# Patient Record
Sex: FEMALE
[De-identification: names, ages, dates, MRNs, and addresses within clinical notes are randomized per-mention and may not be internally consistent; named-entity substitution may affect disease eponyms.]

---

## 2017-08-08 ENCOUNTER — HOSPITAL ENCOUNTER (EMERGENCY)
Dept: HOSPITAL 14 - H.ER | Age: 27
Discharge: HOME | End: 2017-08-08
Payer: MEDICAID

## 2017-08-08 VITALS
OXYGEN SATURATION: 100 % | DIASTOLIC BLOOD PRESSURE: 69 MMHG | SYSTOLIC BLOOD PRESSURE: 135 MMHG | HEART RATE: 61 BPM | TEMPERATURE: 97.8 F | RESPIRATION RATE: 16 BRPM

## 2017-08-08 VITALS — BODY MASS INDEX: 25.6 KG/M2

## 2017-08-08 DIAGNOSIS — N76.0: Primary | ICD-10-CM

## 2017-08-08 NOTE — ED PDOC
HPI: Female  Pain


Time Seen by Provider: 08/08/17 05:27


Chief Complaint (Nursing): Abnormal Skin Integrity


Chief Complaint (Provider): vaginal burning


History Per: Patient


History/Exam Limitations: no limitations


Onset/Duration Of Symptoms: Days (3)


Current Symptoms Are (Timing): Still Present


Quality Of Discomfort: Burning


Additional History Per: Patient


Additional Complaint(s): 





27 y/o female presents for eval of burning to vaginal area x 3 days.  Patient 

states she was seen less than an month ago for similar symptoms, had STD 

testing which was negative, but is concerned that this could be whats causing 

her burning.  Patient denies new sexual partner/encounter since last ED visit.  

States symptoms started after being at the beach over the weekend.  Denies fever

, nausea/vomiting, abdominal pain/pelvic pain, dysuria, hematuria, vaginal 

bleeding/discharge, foul odor. 





Past Medical History


Reviewed: Historical Data, Nursing Documentation, Vital Signs


Vital Signs: 





 Last Vital Signs











Temp  97.8 F   08/08/17 05:37


 


Pulse  61   08/08/17 05:37


 


Resp  16   08/08/17 05:37


 


BP  135/69   08/08/17 05:37


 


Pulse Ox  100   08/08/17 05:37














- Medical History


PMH: No Chronic Diseases


   Denies: Chronic Kidney Disease





- Surgical History


Surgical History: No Surg Hx





- Family History


Family History: States: Unknown Family Hx





- Home Medications


Home Medications: 


 Ambulatory Orders











 Medication  Instructions  Recorded


 


Phenazopyridine [Pyridium] 100 mg PO BID #6 tab 07/10/17














- Allergies


Allergies/Adverse Reactions: 


 Allergies











Allergy/AdvReac Type Severity Reaction Status Date / Time


 


No Known Allergies Allergy   Verified 07/10/17 12:55














Review of Systems


ROS Statement: Except As Marked, All Systems Reviewed And Found Negative


Genitourinary Female: Positive for: Other (vaginal burning)





Physical Exam





- Reviewed


Nursing Documentation Reviewed: Yes


Vital Signs Reviewed: Yes





- Physical Exam


Appears: Positive for: Well, Non-toxic, No Acute Distress


Head Exam: Positive for: ATRAUMATIC, NORMAL INSPECTION, NORMOCEPHALIC


Skin: Positive for: Normal Color


Eye Exam: Positive for: Normal appearance


Cardiovascular/Chest: Positive for: Regular Rate, Rhythm


Respiratory: Positive for: Normal Breath Sounds


Gastrointestinal/Abdominal: Positive for: Normal Exam, Bowel Sounds, Soft.  

Negative for: Tenderness


Pelvic Exam: Positive for: External Exam Normal, No Cerv. Motion Tender, 

Discharge (minimal white discharged noted labia minora with mild erythema; no 

swelling, abscess, lesions noted), Other (exam chaperoned by Alexia ED tech).  

Negative for: Active Bleeding, Blood, Cervicitis


Extremity: Positive for: Normal ROM


Neurologic/Psych: Positive for: Alert, Oriented





- Laboratory Results


Urine Pregnancy POC: Negative


Urine dip results: Negative for: Leukocyte Esterase, Blood, Nitrate, Ketones, 

Glucose





- ECG


O2 Sat by Pulse Oximetry: 100





- Progress


ED Course And Treament: 





urine, genital cultures





Patient given Diflucan dose in ED, educated on proper hygiene.


Advised follow up Gyn 2-3 days.


Return to ED for worsening/concerning symptoms.








Disposition





- Clinical Impression


Clinical Impression: 


 Vaginitis








- Patient ED Disposition


Is Patient to be Admitted: No


Counseled Patient/Family Regarding: Studies Performed, Diagnosis, Need For 

Followup





- Disposition


Referrals: 


Neel Verduzco MD [Primary Care Provider] - 


Disposition: Routine/Home


Disposition Time: 05:50


Condition: GOOD


Instructions:  Vaginitis (ED)

## 2019-01-08 ENCOUNTER — HOSPITAL ENCOUNTER (EMERGENCY)
Dept: HOSPITAL 14 - H.ER | Age: 29
Discharge: HOME | End: 2019-01-08
Payer: SELF-PAY

## 2019-01-08 VITALS
OXYGEN SATURATION: 100 % | TEMPERATURE: 98.4 F | HEART RATE: 74 BPM | DIASTOLIC BLOOD PRESSURE: 82 MMHG | SYSTOLIC BLOOD PRESSURE: 138 MMHG | RESPIRATION RATE: 16 BRPM

## 2019-01-08 VITALS — BODY MASS INDEX: 26.5 KG/M2

## 2019-01-08 DIAGNOSIS — R21: ICD-10-CM

## 2019-01-08 DIAGNOSIS — R23.2: Primary | ICD-10-CM

## 2019-01-08 NOTE — ED PDOC
HPI: General Adult


Time Seen by Provider: 19 19:38


Chief Complaint (Nursing): Allergic Reaction


Chief Complaint (Provider): Skin Irritation


History Per: Patient


History/Exam Limitations: no limitations


Onset/Duration Of Symptoms: Days (1x week)


Current Symptoms Are (Timing): Intermittent Episodes


Additional Complaint(s): 


28 year old female with no past medical history presents to the ED for an 

evaluation of intermittent facial flushing and redness to the bilateral hands. 

Patient reports that she experienced similar symptoms last year when her hands 

got red and had a burning sensation, but this would resolve spontaneously after 

a few minutes. After multiple episodes, patient went to her PMD who ran an CORBY 

profile, which was unremarkable. Symptoms had not returned until this week when 

her hands got red and she developed facial flushing. Patient reports seeing a 

dermatologist for an evaluation of the facial flushing, where they said it was 

not rosacea, but a nonspecific rash. Further diagnostics were not performed. 

Patient seeking second opinion. Otherwise: (-) fevers, (-) chills, (-) nausea, 

(-) vomiting, (-) diarrhea, (-)abdominal pain, (-) chest pain, (-) joint pain, 

(-) rashes, (-) sick contacts, (-) recent travel, (-) urinary symptoms. Last 

menstrual period: 2018.





PMD: Neel Verduzco MD





Past Medical History


Reviewed: Historical Data, Nursing Documentation, Vital Signs


Vital Signs: 





                                Last Vital Signs











Temp  98.4 F   19 19:15


 


Pulse  74   19 19:15


 


Resp  16   19 19:15


 


BP  138/82   19 19:15


 


Pulse Ox  100   19 19:15











ROSANNA Report Viewed: Yes





- Medical History


PMH: No Chronic Diseases





- Surgical History


Surgical History: No Surg Hx





- Family History


Family History: States: No Known Family Hx





- Social History


Current smoker - smoking cessation education provided: No


Alcohol: Occasional


Drugs: Denies





- Home Medications


Home Medications: 


                                Ambulatory Orders











 Medication  Instructions  Recorded


 


Phenazopyridine [Pyridium] 100 mg PO BID #6 tab 07/10/17


 


DiphenhydrAMINE [Benadryl] 50 mg PO Q6 PRN #20 cap 19














- Allergies


Allergies/Adverse Reactions: 


                                    Allergies











Allergy/AdvReac Type Severity Reaction Status Date / Time


 


No Known Allergies Allergy   Verified 19 19:14














Review of Systems


ROS Statement: Except As Marked, All Systems Reviewed And Found Negative


Constitutional: Negative for: Fever, Chills


Cardiovascular: Negative for: Chest Pain


Gastrointestinal: Negative for: Nausea, Vomiting, Abdominal Pain, Diarrhea


Genitourinary Female: Negative for: Dysuria, Hematuria


Musculoskeletal: Negative for: Other (joint pain)


Skin: Positive for: Other (intermittent episodes of facial flushing and redness 

to bilateral hands with a burning sensation).  Negative for: Rash





Physical Exam





- Reviewed


Nursing Documentation Reviewed: Yes


Vital Signs Reviewed: Yes





- Physical Exam


Comments: 


GENERAL APPEARANCE: Patient is awake, alert, oriented x 3, in no acute distress.

 Resting comfortably.


SKIN: (-) rash (-) erythema (-) warmth (-) lesions, (-) excoriations, (-) 

drainage (-) crusting (-) cellulitis


HENT: (+) mild facial flushing, (-)conjunctival injection, (-) chemosis. 

Oropharynx: clear (-) tongue or lip swelling, (-) tonsillar exudates, (-) erythe

ma. Airway: patent (-) stridor, (-) hoarseness.  Mucous membranes moist. Nares: 

Patent (-) rhinorrhea.


NECK: Supple, FROM(-) lymphadenopathy, (-) tenderness. 


CARDIOVASCULAR: Normal rate and rhythm.  


CHEST: (-) rales, (-) wheezing, (-) dyspnea, (-) stridor. Breath sounds equal 

bilaterally. Respirations even and nonlabored. 


ABDOMEN: Soft. (-) tenderness, (-) distention


EXTREMITIES: Full ROM of all digits, bilateral hands, bilateral wrists. 


NEURO: Mental status: Patient is alert, oriented,  and with normal strength and 

tone. Gait: steady. Speech: clear. (-) facial asymmetry








- ECG


O2 Sat by Pulse Oximetry: 100 (RA)


Pulse Ox Interpretation: Normal





Medical Decision Making


Medical Decision Makin:35


Clinical impression: 28 year old female with concern for skin irritation. 


Spoke to ED MD Posadas who agrees that no further diagnostics are required in 

the ED at this time. 


Patient is advised to follow up with PMD, dermatology, and rheumatology. 





Based on history, exam and diagnostic results, plan will be for outpatient 

follow up . 


Patient instructed to follow-up with pmd / referral provided / the clinic  in 1-

2 days without fail. Advised to take medication as prescribed. Return to the 

emergency room at any time for any new or worsening symptoms. Patient states she

 fully agrees with and understands discharge instructions. States that she 

agrees with the plan and disposition. Verbalized and repeated discharge 

instructions and plan. I have given the patient opportunity to ask any 

additional questions.


 

--------------------------------------------------------------------------------


-----------------


Scribe Attestation:


Documented byLsia Bonilla, acting as a scribe for Lisa CRISTOBAL





Provider Scribe Attestation:


All medical record entries made by the Scribe were at my direction and 

personally dictated by me. I have reviewed the chart and agree that the record 

accurately reflects my personal performance of the history, physical exam, 

medical decision making, and the department course for this patient. I have also

 personally directed, reviewed, and agree with the discharge instructions and 

disposition.





Disposition





- Clinical Impression


Clinical Impression: 


 Facial flushing, Skin redness or inflammation








- Patient ED Disposition


Is Patient to be Admitted: No


Counseled Patient/Family Regarding: Studies Performed, Diagnosis, Need For 

Followup, Rx Given





- Disposition


Referrals: 


Neel Verduzco MD [Medical Doctor] - 


Disposition: Routine/Home


Disposition Time: 20:55


Condition: STABLE


Additional Instructions: 


FOLLOW UP WITH RHEUMATOLOGY.





The emergency medical care you received today was directed at your acute 

symptoms. If you were prescribed any medication, please fill it and take as 

directed. It may take several days for your symptoms to resolve. Return to the 

Emergency Department if your symptoms worsen, do not improve, or if you have any

other problems.





Please contact your doctor in 2 days for re-evaluation and follow up / or call 

one of the physicians/clinics you have been referred to that are listed on the 

Patient Visit Information form that is included in your discharge packet. Bring 

any paperwork you were given at discharge with you along with any medications 

you are taking to your follow up visit. Our treatment cannot replace ongoing 

medical care by a primary care provider (PCP) outside of the emergency 

department.


Prescriptions: 


DiphenhydrAMINE [Benadryl] 50 mg PO Q6 PRN #20 cap


 PRN Reason: SKIN IRRITATION


Instructions:  Dermatitis


Forms:  [facility] (English)


Print Language: ENGLISH





- POA


Present On Arrival: None